# Patient Record
Sex: FEMALE | Race: WHITE | NOT HISPANIC OR LATINO | URBAN - METROPOLITAN AREA
[De-identification: names, ages, dates, MRNs, and addresses within clinical notes are randomized per-mention and may not be internally consistent; named-entity substitution may affect disease eponyms.]

---

## 2019-06-13 ENCOUNTER — OFFICE (OUTPATIENT)
Dept: URBAN - METROPOLITAN AREA CLINIC 78 | Facility: CLINIC | Age: 29
End: 2019-06-13
Payer: MEDICAID

## 2019-06-13 VITALS
DIASTOLIC BLOOD PRESSURE: 87 MMHG | HEIGHT: 63 IN | WEIGHT: 211 LBS | HEART RATE: 103 BPM | TEMPERATURE: 98.2 F | SYSTOLIC BLOOD PRESSURE: 102 MMHG

## 2019-06-13 DIAGNOSIS — K21.9 GASTRO-ESOPHAGEAL REFLUX DISEASE WITHOUT ESOPHAGITIS: ICD-10-CM

## 2019-06-13 DIAGNOSIS — R19.7 DIARRHEA, UNSPECIFIED: ICD-10-CM

## 2019-06-13 DIAGNOSIS — K58.0 IRRITABLE BOWEL SYNDROME WITH DIARRHEA: ICD-10-CM

## 2019-06-13 DIAGNOSIS — R07.0 PAIN IN THROAT: ICD-10-CM

## 2019-06-13 PROCEDURE — 99204 OFFICE O/P NEW MOD 45 MIN: CPT

## 2019-06-13 NOTE — SERVICEHPINOTES
BETHANY ARCE   is a   28   year old    female who is being seen in consultation at the request of   DERRELL PINK   for throat pain, reflux, and IBS. Has been having some reflux symptoms over the past couple of years. She has some belching and sour taste in her mouth and can get a lot of pain in her throat- feels like a raw area. Can feel sore all the time, not really affected by eating other than fatty foods make it worse. Has been on Nexium 20 mg daily, though recently increased to 40 mg which helps her throat pain somewhat. She used to be on Vyvanse and felt this made it worse so stopped taking it. A remote trial of Levsin SL caused a lot of throat pain. She avoids NSAIDs.She has h/o IBS with frequent diarrhea. Initial symptoms started after an infection (she thinks Giardia). Can have urgency, bloating, and abdominal soreness. FODMAPs make symptoms worse. Imodium makes her feel worse. Has tried peppermint oil pills in the past without much benefit.She had an EGD and colonoscopy in 2015. She reports h/o esophageal stricture at age 21 - this was related to doxycycline use (for acne).Has had periods of time without insurance. Would like to get another EGD now. Has not seen ENT yet. Recent ER visit reveals unremarkable labs and CT abd/pelvis. She notes h/o PCOS.

## 2019-08-08 ENCOUNTER — AMBULATORY SURGICAL CENTER (OUTPATIENT)
Dept: URBAN - METROPOLITAN AREA SURGERY 21 | Facility: SURGERY | Age: 29
End: 2019-08-08

## 2019-08-08 ENCOUNTER — AMBULATORY SURGICAL CENTER (OUTPATIENT)
Dept: URBAN - METROPOLITAN AREA SURGERY 21 | Facility: SURGERY | Age: 29
End: 2019-08-08
Payer: MEDICAID

## 2019-08-08 DIAGNOSIS — K29.60 OTHER GASTRITIS WITHOUT BLEEDING: ICD-10-CM

## 2019-08-08 DIAGNOSIS — K21.9 GASTRO-ESOPHAGEAL REFLUX DISEASE WITHOUT ESOPHAGITIS: ICD-10-CM

## 2019-08-08 PROCEDURE — 00002: CPT

## 2019-08-08 PROCEDURE — 43239 EGD BIOPSY SINGLE/MULTIPLE: CPT

## 2019-09-04 PROBLEM — K63.5 POLYP OF COLON: Status: ACTIVE | Noted: 2019-07-30

## 2019-09-12 ENCOUNTER — AMBULATORY SURGICAL CENTER (OUTPATIENT)
Dept: URBAN - METROPOLITAN AREA SURGERY 21 | Facility: SURGERY | Age: 29
End: 2019-09-12
Payer: MEDICAID

## 2019-09-12 DIAGNOSIS — Z86.010 PERSONAL HISTORY OF COLONIC POLYPS: ICD-10-CM

## 2019-09-12 PROCEDURE — 45380 COLONOSCOPY AND BIOPSY: CPT | Mod: PT
